# Patient Record
Sex: MALE | Race: OTHER
[De-identification: names, ages, dates, MRNs, and addresses within clinical notes are randomized per-mention and may not be internally consistent; named-entity substitution may affect disease eponyms.]

---

## 2018-09-19 ENCOUNTER — HOSPITAL ENCOUNTER (INPATIENT)
Dept: HOSPITAL 54 - GPS | Age: 68
LOS: 5 days | Discharge: HOME | DRG: 885 | End: 2018-09-24
Attending: PSYCHIATRY & NEUROLOGY | Admitting: PSYCHIATRY & NEUROLOGY
Payer: MEDICARE

## 2018-09-19 VITALS — SYSTOLIC BLOOD PRESSURE: 144 MMHG | DIASTOLIC BLOOD PRESSURE: 81 MMHG

## 2018-09-19 VITALS — DIASTOLIC BLOOD PRESSURE: 79 MMHG | SYSTOLIC BLOOD PRESSURE: 132 MMHG

## 2018-09-19 VITALS — DIASTOLIC BLOOD PRESSURE: 74 MMHG | SYSTOLIC BLOOD PRESSURE: 139 MMHG

## 2018-09-19 VITALS — HEIGHT: 66 IN | BODY MASS INDEX: 19.29 KG/M2 | WEIGHT: 120 LBS

## 2018-09-19 DIAGNOSIS — F29: ICD-10-CM

## 2018-09-19 DIAGNOSIS — E03.9: ICD-10-CM

## 2018-09-19 DIAGNOSIS — E78.5: ICD-10-CM

## 2018-09-19 DIAGNOSIS — Z73.6: ICD-10-CM

## 2018-09-19 DIAGNOSIS — Z87.891: ICD-10-CM

## 2018-09-19 DIAGNOSIS — I10: ICD-10-CM

## 2018-09-19 DIAGNOSIS — F33.3: Primary | ICD-10-CM

## 2018-09-19 DIAGNOSIS — E11.9: ICD-10-CM

## 2018-09-19 RX ADMIN — THYROID, PORCINE SCH MG: 30 TABLET ORAL at 11:30

## 2018-09-19 RX ADMIN — SERTRALINE HYDROCHLORIDE SCH MG: 25 TABLET, FILM COATED ORAL at 11:30

## 2018-09-19 RX ADMIN — PREGABALIN SCH MG: 25 CAPSULE ORAL at 12:39

## 2018-09-19 RX ADMIN — METFORMIN HYDROCHLORIDE SCH MG: 500 TABLET, FILM COATED ORAL at 16:53

## 2018-09-19 RX ADMIN — PREGABALIN SCH MG: 25 CAPSULE ORAL at 16:53

## 2018-09-19 RX ADMIN — OLANZAPINE SCH MG: 2.5 TABLET ORAL at 21:30

## 2018-09-19 RX ADMIN — NICOTINE SCH MG: 21 PATCH TRANSDERMAL at 09:13

## 2018-09-19 RX ADMIN — METFORMIN HYDROCHLORIDE SCH MG: 500 TABLET, FILM COATED ORAL at 11:21

## 2018-09-19 NOTE — NUR
SW called the pt's brother, Mickey Hollingsworth ([phone]), and the phone number was not valid.

## 2018-09-19 NOTE — NUR
GPS/RN OPENING NOTES

RECEIVED PATIENT IN BED, RESTING COMFORTABLY I BED, OBSERVE NO GUARDING OR GRIMACE, NO PAIN 
VERBALIZED OR REPORTED, RESPIRATIONS EVEN AND UNLABORED. WILL MONITOR AND ATTEND TO NEEDS AT 
ALL TIMES. BED IN LOCK POSITION. SIDE RAILS 2X UP.

## 2018-09-19 NOTE — NUR
GPS ADMISSION NOTE, RECEIVED PATIENT FROM [facility] [place]. PATIENT ARRIVED ON THIS 
UNIT AT 0253 VIA STRETCHER WITH 2 EMT ESCORTS.  PATIENT ADMITTED ON A 5150 HOLD FOR GD.  PER 
HOLD PATIENT WAS IN E.R. WAITING ROOM REFUSING TO SPEAK AND REFUSING MEDICAL TREATMENT.  
PATIENT PRESENTS WITH PARANOIA AND DELUSIONS.  PATIENT CAN NOT GIVE VIABLE PLAN FOR SELF 
CARE AT THIS TIME.  THE 5150 WAS REVIEWED AND THE DOCUMENTATION IN THE 5150 HOLD APPEARS TO 
REFLECT THE PRESENTATION OF THE PATIENT.  UPON FACE TO FACE ASSESSMENT PATIENT IS CURRENTLY 
LYING IN BED AWAKE, PATIENT HAS A COMPLAINT OF BILATERAL HEEL PAIN AT 2 OUT OF 10 ON THE 
PAIN SCALE.  PATIENT IS TAKING ORAL PAIN MEDICATION FOR THIS PAIN.  PATIENT IS DISPLAYING NO 
S/S OF APPARENT DISTRESS.  PATIENT BREATHING IS UNLABORED WITH EQUAL RISE AND FALL OF THE 
CHEST.  PATIENT IS ALERT AND ORIENTATED X 3 ON ROOM AIR.  PATIENT ASSISTED WITH TURING AND 
REPOSITIONING Q2HR AND PRN FOR COMFORT AND CIRCULATION.  PATIENT HAS NO NEEDS AT THIS TIME.  
PATIENT IS NOTED TO BEING DEPRESSED, DISHEVELED, DISORGANIZED, ANXIOUS, COOPERATIVE, AND 
NEEDS REDIRECTION.  PATIENT DENIES SUICIDE IDEATIONS AND HOMICIDAL IDEATIONS AT THIS TIME.  
PATIENT IS UNDER THE PSYCHIATRIC CARE OF DR. GARAY AND THE MEDICAL CARE OF DR MORLEY.  
PATIENT BELONGINGS WERE INVENTORIED AND CHECKED FOR CONTRABAND.  ALL CONTRABAND REMOVED AND 
STORED IN PATIENT HALLWAY LOCKER.  PATIENT ADVANCED DIRECTIVES PREFERENCE, IMMUNIZATIONS 
QUESTIONER, NECESSARY PAPERWORK, AND SKIN ASSESSMENT COMPLETED.  PATIENT ORIENTATED TO ROOM, 
FLOOR, AND STAFF WITH ALL QUESTIONS ANSWERED.  PATIENT EDUCATED ON THE USE OF THE CALL BELL. 
 PATIENT BED SIDE RAILS ARE UP X 2 FOR SAFETY.  PATIENT BED IS LOCKED, LOW AND I WILL 
CONTINUE TO MONITOR THIS PATIENT Q 15 MIN WITH THE HELP OF STAFF TO MAINTAIN SAFETY.

## 2018-09-19 NOTE — NUR
Initial Discharge Plan: Pt states that he resides with his girlfriend on his brother, Mickey Hollingsworth's (phone number not available) property located at [address] [place] [address]; ([phone]). Per pt, he is not sure about his discharge plan. SW will work with 
the pt and the MD regarding appropriate discharge planning. SW will form a safe and proper 
discharge.

## 2018-09-20 VITALS — SYSTOLIC BLOOD PRESSURE: 118 MMHG | DIASTOLIC BLOOD PRESSURE: 70 MMHG

## 2018-09-20 VITALS — SYSTOLIC BLOOD PRESSURE: 125 MMHG | DIASTOLIC BLOOD PRESSURE: 70 MMHG

## 2018-09-20 VITALS — SYSTOLIC BLOOD PRESSURE: 118 MMHG | DIASTOLIC BLOOD PRESSURE: 75 MMHG

## 2018-09-20 LAB
BASOPHILS # BLD AUTO: 0 /CMM (ref 0–0.2)
BASOPHILS NFR BLD AUTO: 0.3 % (ref 0–2)
BUN SERPL-MCNC: 23 MG/DL (ref 7–18)
CALCIUM SERPL-MCNC: 8.6 MG/DL (ref 8.5–10.1)
CHLORIDE SERPL-SCNC: 107 MMOL/L (ref 98–107)
CHOLEST SERPL-MCNC: 226 MG/DL (ref ?–200)
CO2 SERPL-SCNC: 27 MMOL/L (ref 21–32)
CREAT SERPL-MCNC: 0.9 MG/DL (ref 0.6–1.3)
EOSINOPHIL NFR BLD AUTO: 2.2 % (ref 0–6)
GLUCOSE SERPL-MCNC: 137 MG/DL (ref 74–106)
HCT VFR BLD AUTO: 44 % (ref 39–51)
HDLC SERPL-MCNC: 57 MG/DL (ref 40–60)
HGB BLD-MCNC: 14.2 G/DL (ref 13.5–17.5)
LDLC SERPL DIRECT ASSAY-MCNC: 159 MG/DL (ref 0–99)
LYMPHOCYTES NFR BLD AUTO: 3.5 /CMM (ref 0.8–4.8)
LYMPHOCYTES NFR BLD AUTO: 36 % (ref 20–44)
MAGNESIUM SERPL-MCNC: 1.7 MG/DL (ref 1.8–2.4)
MCH RBC QN AUTO: 30 PG (ref 26–33)
MCHC RBC AUTO-ENTMCNC: 32 G/DL (ref 31–36)
MCV RBC AUTO: 94 FL (ref 80–96)
MONOCYTES NFR BLD AUTO: 0.8 /CMM (ref 0.1–1.3)
MONOCYTES NFR BLD AUTO: 8.5 % (ref 2–12)
NEUTROPHILS # BLD AUTO: 5.1 /CMM (ref 1.8–8.9)
NEUTROPHILS NFR BLD AUTO: 53 % (ref 43–81)
PHOSPHATE SERPL-MCNC: 2.9 MG/DL (ref 2.5–4.9)
PLATELET # BLD AUTO: 222 /CMM (ref 150–450)
POTASSIUM SERPL-SCNC: 4 MMOL/L (ref 3.5–5.1)
RBC # BLD AUTO: 4.72 MIL/UL (ref 4.5–6)
RDW COEFFICIENT OF VARIATION: 13.1 (ref 11.5–15)
SODIUM SERPL-SCNC: 142 MMOL/L (ref 136–145)
TRIGL SERPL-MCNC: 102 MG/DL (ref 30–150)
WBC NRBC COR # BLD AUTO: 9.6 K/UL (ref 4.3–11)

## 2018-09-20 RX ADMIN — OLANZAPINE SCH MG: 2.5 TABLET ORAL at 22:02

## 2018-09-20 RX ADMIN — NICOTINE SCH MG: 21 PATCH TRANSDERMAL at 09:00

## 2018-09-20 RX ADMIN — PREGABALIN SCH MG: 25 CAPSULE ORAL at 17:02

## 2018-09-20 RX ADMIN — Medication SCH EACH: at 07:57

## 2018-09-20 RX ADMIN — Medication SCH EACH: at 22:02

## 2018-09-20 RX ADMIN — PREGABALIN SCH MG: 25 CAPSULE ORAL at 12:11

## 2018-09-20 RX ADMIN — Medication SCH EACH: at 12:12

## 2018-09-20 RX ADMIN — PREGABALIN SCH MG: 25 CAPSULE ORAL at 09:01

## 2018-09-20 RX ADMIN — SERTRALINE HYDROCHLORIDE SCH MG: 25 TABLET, FILM COATED ORAL at 09:01

## 2018-09-20 RX ADMIN — ATORVASTATIN CALCIUM SCH MG: 10 TABLET, FILM COATED ORAL at 09:01

## 2018-09-20 RX ADMIN — METFORMIN HYDROCHLORIDE SCH MG: 500 TABLET, FILM COATED ORAL at 09:01

## 2018-09-20 RX ADMIN — Medication SCH EACH: at 17:50

## 2018-09-20 RX ADMIN — METFORMIN HYDROCHLORIDE SCH MG: 500 TABLET, FILM COATED ORAL at 17:02

## 2018-09-20 RX ADMIN — ACETAMINOPHEN PRN MG: 325 TABLET ORAL at 06:25

## 2018-09-20 RX ADMIN — THYROID, PORCINE SCH MG: 30 TABLET ORAL at 09:01

## 2018-09-20 NOTE — NUR
GPS/RN NOTES

PATIENT AWAKE AND REPORTED TO HAVE PAIN IN LEGS, REPORTED HE HAS DIABETIC NEUROPATHY. INFORM 
HE HAS TYLENOL 650MG TO RELIEVE PAIN AND AGREED TO HAVE IT, WILL INFORM AM FOR CONCERN.

## 2018-09-21 VITALS — SYSTOLIC BLOOD PRESSURE: 128 MMHG | DIASTOLIC BLOOD PRESSURE: 77 MMHG

## 2018-09-21 VITALS — SYSTOLIC BLOOD PRESSURE: 123 MMHG | DIASTOLIC BLOOD PRESSURE: 76 MMHG

## 2018-09-21 RX ADMIN — Medication SCH EACH: at 17:21

## 2018-09-21 RX ADMIN — TEMAZEPAM PRN MG: 7.5 CAPSULE ORAL at 22:55

## 2018-09-21 RX ADMIN — Medication SCH EACH: at 12:20

## 2018-09-21 RX ADMIN — Medication SCH EACH: at 08:51

## 2018-09-21 RX ADMIN — ATORVASTATIN CALCIUM SCH MG: 10 TABLET, FILM COATED ORAL at 21:40

## 2018-09-21 RX ADMIN — METFORMIN HYDROCHLORIDE SCH MG: 500 TABLET, FILM COATED ORAL at 08:52

## 2018-09-21 RX ADMIN — OLANZAPINE SCH MG: 2.5 TABLET ORAL at 21:40

## 2018-09-21 RX ADMIN — ACETAMINOPHEN PRN MG: 325 TABLET ORAL at 22:13

## 2018-09-21 RX ADMIN — Medication SCH EACH: at 22:16

## 2018-09-21 RX ADMIN — PREGABALIN SCH MG: 25 CAPSULE ORAL at 17:08

## 2018-09-21 RX ADMIN — PREGABALIN SCH MG: 25 CAPSULE ORAL at 12:21

## 2018-09-21 RX ADMIN — PREGABALIN SCH MG: 25 CAPSULE ORAL at 08:51

## 2018-09-21 RX ADMIN — SERTRALINE HYDROCHLORIDE SCH MG: 25 TABLET, FILM COATED ORAL at 08:52

## 2018-09-21 RX ADMIN — NICOTINE SCH MG: 21 PATCH TRANSDERMAL at 08:51

## 2018-09-21 RX ADMIN — METFORMIN HYDROCHLORIDE SCH MG: 500 TABLET, FILM COATED ORAL at 17:08

## 2018-09-21 RX ADMIN — THYROID, PORCINE SCH MG: 30 TABLET ORAL at 08:52

## 2018-09-21 NOTE — NUR
REJI called the pt's girlfriend, Ralph ([phone]), and informed her that the pt will be 
discharged by Tuesday according to the hearing  due to an appointment that he has for 
surgery. It was also discussed that the pt will be discharging with home health.

## 2018-09-21 NOTE — NUR
REJI called the pt's brother, Mickey ([phone]), and clarified some details of the 
situation regarding the pt. The brother stated that the pt is being evicted from his 
property and was given a 30 Day Notice and that he is not willing to be involved in the 
discharge planning.

## 2018-09-22 VITALS — DIASTOLIC BLOOD PRESSURE: 84 MMHG | SYSTOLIC BLOOD PRESSURE: 175 MMHG

## 2018-09-22 VITALS — SYSTOLIC BLOOD PRESSURE: 134 MMHG | DIASTOLIC BLOOD PRESSURE: 75 MMHG

## 2018-09-22 VITALS — SYSTOLIC BLOOD PRESSURE: 143 MMHG | DIASTOLIC BLOOD PRESSURE: 82 MMHG

## 2018-09-22 VITALS — DIASTOLIC BLOOD PRESSURE: 88 MMHG | SYSTOLIC BLOOD PRESSURE: 118 MMHG

## 2018-09-22 RX ADMIN — NICOTINE SCH MG: 21 PATCH TRANSDERMAL at 08:56

## 2018-09-22 RX ADMIN — ACETAMINOPHEN PRN MG: 325 TABLET ORAL at 20:00

## 2018-09-22 RX ADMIN — TEMAZEPAM PRN MG: 7.5 CAPSULE ORAL at 21:19

## 2018-09-22 RX ADMIN — SERTRALINE HYDROCHLORIDE SCH MG: 25 TABLET, FILM COATED ORAL at 08:56

## 2018-09-22 RX ADMIN — ATORVASTATIN CALCIUM SCH MG: 10 TABLET, FILM COATED ORAL at 21:16

## 2018-09-22 RX ADMIN — Medication SCH EACH: at 11:50

## 2018-09-22 RX ADMIN — NICOTINE SCH MG: 21 PATCH TRANSDERMAL at 09:00

## 2018-09-22 RX ADMIN — Medication PRN EA: at 17:48

## 2018-09-22 RX ADMIN — Medication SCH EACH: at 21:16

## 2018-09-22 RX ADMIN — METFORMIN HYDROCHLORIDE SCH MG: 500 TABLET, FILM COATED ORAL at 17:21

## 2018-09-22 RX ADMIN — Medication SCH EACH: at 17:22

## 2018-09-22 RX ADMIN — OLANZAPINE SCH MG: 2.5 TABLET ORAL at 21:18

## 2018-09-22 RX ADMIN — PREGABALIN SCH MG: 25 CAPSULE ORAL at 17:21

## 2018-09-22 RX ADMIN — PREGABALIN SCH MG: 25 CAPSULE ORAL at 08:56

## 2018-09-22 RX ADMIN — TEMAZEPAM PRN MG: 7.5 CAPSULE ORAL at 20:29

## 2018-09-22 RX ADMIN — ACETAMINOPHEN PRN MG: 325 TABLET ORAL at 17:21

## 2018-09-22 RX ADMIN — Medication SCH EACH: at 08:03

## 2018-09-22 RX ADMIN — PREGABALIN SCH MG: 25 CAPSULE ORAL at 13:28

## 2018-09-22 RX ADMIN — METFORMIN HYDROCHLORIDE SCH MG: 500 TABLET, FILM COATED ORAL at 08:55

## 2018-09-22 RX ADMIN — THYROID, PORCINE SCH MG: 30 TABLET ORAL at 08:56

## 2018-09-22 NOTE — NUR
PATIENT C/O PAIN ON THE LEFT FOOT, TYLENOL IS NOT EFFECTIVE.AGED AND SPOKE TO NP ROQUE GREEN, NEW ORDER GIVEN FOR NORCO 5/325 MG TAB 1 PO Q 4 H PRN PAIN

## 2018-09-22 NOTE — NUR
EARLIER IN DAY PT. BECAME VERY ANXIOUS AND INSISTED VALUABLE ENVELOPE BE REMOVED FROM SAFE 
AND ALL MONIES REVIEWED WITH CHARGE RN-THEN NEW ENVELOPE RETURNED TO SAFE.PT. COOPERATIVE 
AND CALMER AFTER THIS DONE.

## 2018-09-23 VITALS — SYSTOLIC BLOOD PRESSURE: 138 MMHG | DIASTOLIC BLOOD PRESSURE: 80 MMHG

## 2018-09-23 VITALS — DIASTOLIC BLOOD PRESSURE: 76 MMHG | SYSTOLIC BLOOD PRESSURE: 143 MMHG

## 2018-09-23 VITALS — SYSTOLIC BLOOD PRESSURE: 139 MMHG | DIASTOLIC BLOOD PRESSURE: 86 MMHG

## 2018-09-23 RX ADMIN — ACETAMINOPHEN PRN MG: 325 TABLET ORAL at 08:06

## 2018-09-23 RX ADMIN — PREGABALIN SCH MG: 25 CAPSULE ORAL at 16:30

## 2018-09-23 RX ADMIN — METFORMIN HYDROCHLORIDE SCH MG: 500 TABLET, FILM COATED ORAL at 16:30

## 2018-09-23 RX ADMIN — ATORVASTATIN CALCIUM SCH MG: 10 TABLET, FILM COATED ORAL at 21:31

## 2018-09-23 RX ADMIN — Medication SCH EACH: at 21:38

## 2018-09-23 RX ADMIN — Medication SCH EACH: at 17:30

## 2018-09-23 RX ADMIN — Medication SCH EACH: at 11:50

## 2018-09-23 RX ADMIN — PREGABALIN SCH MG: 25 CAPSULE ORAL at 12:13

## 2018-09-23 RX ADMIN — METFORMIN HYDROCHLORIDE SCH MG: 500 TABLET, FILM COATED ORAL at 08:10

## 2018-09-23 RX ADMIN — Medication PRN EA: at 10:09

## 2018-09-23 RX ADMIN — THYROID, PORCINE SCH MG: 30 TABLET ORAL at 08:05

## 2018-09-23 RX ADMIN — OLANZAPINE SCH MG: 2.5 TABLET ORAL at 21:31

## 2018-09-23 RX ADMIN — NICOTINE SCH MG: 21 PATCH TRANSDERMAL at 08:07

## 2018-09-23 RX ADMIN — SERTRALINE HYDROCHLORIDE SCH MG: 25 TABLET, FILM COATED ORAL at 08:06

## 2018-09-23 RX ADMIN — Medication SCH EACH: at 07:39

## 2018-09-23 RX ADMIN — PREGABALIN SCH MG: 25 CAPSULE ORAL at 08:06

## 2018-09-23 RX ADMIN — TEMAZEPAM PRN MG: 7.5 CAPSULE ORAL at 21:31

## 2018-09-23 NOTE — NUR
GPS RN NOTE



PATIENT HAD RESTORIL AS REQUESTED BUT STILL AWAKE IN BED, STATED HE IS TRYING TO GO TO 
SLEEP. WILL MONITOR CLOSELY.

## 2018-09-23 NOTE — NUR
PRN RESTORIL GIVEN



PATIENT REQUESTED TO GET RESTORIL FOR SLEEPLESSNESS, PRN RESTORIL GIVEN & WILL REASSESS FOR 
EFFECTIVENESS.

## 2018-09-24 VITALS — DIASTOLIC BLOOD PRESSURE: 76 MMHG | SYSTOLIC BLOOD PRESSURE: 148 MMHG

## 2018-09-24 RX ADMIN — METFORMIN HYDROCHLORIDE SCH MG: 500 TABLET, FILM COATED ORAL at 09:15

## 2018-09-24 RX ADMIN — Medication SCH EACH: at 07:30

## 2018-09-24 RX ADMIN — Medication SCH EACH: at 12:00

## 2018-09-24 RX ADMIN — SERTRALINE HYDROCHLORIDE SCH MG: 25 TABLET, FILM COATED ORAL at 08:52

## 2018-09-24 RX ADMIN — PREGABALIN SCH MG: 25 CAPSULE ORAL at 12:08

## 2018-09-24 RX ADMIN — ACETAMINOPHEN PRN MG: 325 TABLET ORAL at 03:36

## 2018-09-24 RX ADMIN — THYROID, PORCINE SCH MG: 30 TABLET ORAL at 09:15

## 2018-09-24 RX ADMIN — SERTRALINE HYDROCHLORIDE SCH MG: 25 TABLET, FILM COATED ORAL at 09:15

## 2018-09-24 RX ADMIN — THYROID, PORCINE SCH MG: 30 TABLET ORAL at 08:52

## 2018-09-24 RX ADMIN — METFORMIN HYDROCHLORIDE SCH MG: 500 TABLET, FILM COATED ORAL at 08:51

## 2018-09-24 RX ADMIN — PREGABALIN SCH MG: 25 CAPSULE ORAL at 08:51

## 2018-09-24 RX ADMIN — NICOTINE SCH MG: 21 PATCH TRANSDERMAL at 08:52

## 2018-09-24 NOTE — NUR
SW called the pt's girlfriend, Ralph ([phone]), and informed her that the SW is trying 
to arrange transportation for the discharge through [facility] and that she would 
call her back when there is an update.

## 2018-09-24 NOTE — NUR
GPS RN DISCHARGE NOTE:

PT DISCHARGE HOME TO [address], [place] [address] ,PT IN STABLE CONDITION NO 
S/S DISTRESS NOTED. PT COMPLIANT WITH MEDICATIONS AND TX DENIES SI/HI. DENIES PAIN ANY 
DISCOMFORT. EXIT CARE DONE PRINTED ,SIGN . PRESCRIPTION EXPLAIN GIVEN TO PT, PT VERBALIZED 
UNDERSTANDING . ALL BELONGINGS RETURNED TO PT. MD AWARE AGREE FOR DISCHARGE. SKIN CHECKED 
PICTURE PLACED  IN THE CHART.

## 2018-09-24 NOTE — NUR
Elvira ([phone]) from [facility] called to inform the  that the pt will be 
picked up around 1pm.

## 2018-09-24 NOTE — NUR
PRN TYLENOL GIVEN



PATIENT HAD C/O MILD PAIN TO LEFT FOOT & WANTED TO TAKE TYLENOL. PRN TYLENOL GIVEN & WILL 
MONITOR FOR EFFECTIVENESS.

## 2018-09-24 NOTE — NUR
Discharge Note: Pt was discharged home to [address], [place] [address]; 
([phone]). Pts girlfriend, Ralph ([phone]), was aware of this discharge and 
agreed to it as well as the patient. Elvira Maya ([phone]) from [facility] 
arranged the transportation for the pt to be picked up at 1pm. Upon discharge, the pt 
appeared to be in a euthymic mood with an anxious affect. The pt stated that he is really 
content about going back home and did not want to wait longer. Pt was provided with smoking 
cessation referrals upon discharge due to him stating that he just wanted to smoke once he 
is discharged. Pt was referred to be under the care of psychiatrist, Dr. Gunn, located 
at [address], [place] [address]; [phone] and was referred to be under the 
care of internist, Dr. Wylie, located at [address], [place] [address]; [phone].



Smoking Cessation Referrals:

[employer] 

800-LUNGUSA

  [employer] 

[phone]

## 2018-09-24 NOTE — NUR
REJI called the [facility] Liaison, Cecilia ([phone]), and was 
transferred to her supervisor Elvira ([phone]).

## 2018-09-24 NOTE — NUR
REJI called Elvira ([phone]) from [facility] to arrange transportation and she 
stated that she would call the SW back with the information of the transport.

## 2018-09-24 NOTE — NUR
REJI called the pt's girlfriend, Ralph ([phone]), and informed her that the pt is going 
to be picked up from this facility around 1pm.